# Patient Record
Sex: MALE | Race: WHITE | Employment: FULL TIME | URBAN - METROPOLITAN AREA
[De-identification: names, ages, dates, MRNs, and addresses within clinical notes are randomized per-mention and may not be internally consistent; named-entity substitution may affect disease eponyms.]

---

## 2024-03-21 ENCOUNTER — OFFICE VISIT (OUTPATIENT)
Dept: ORTHOPEDIC SURGERY | Age: 44
End: 2024-03-21

## 2024-03-21 VITALS — WEIGHT: 235 LBS | BODY MASS INDEX: 29.22 KG/M2 | HEIGHT: 75 IN

## 2024-03-21 DIAGNOSIS — S83.241A ACUTE MEDIAL MENISCUS TEAR OF RIGHT KNEE, INITIAL ENCOUNTER: Primary | ICD-10-CM

## 2024-03-21 DIAGNOSIS — M23.91 LOCKED KNEE, RIGHT: ICD-10-CM

## 2024-03-21 DIAGNOSIS — M25.461 EFFUSION OF RIGHT KNEE: ICD-10-CM

## 2024-03-21 DIAGNOSIS — M25.561 RIGHT KNEE PAIN, UNSPECIFIED CHRONICITY: ICD-10-CM

## 2024-03-21 RX ORDER — MELOXICAM 15 MG/1
15 TABLET ORAL DAILY PRN
Qty: 30 TABLET | Refills: 0 | Status: SHIPPED | OUTPATIENT
Start: 2024-03-21

## 2024-03-21 RX ORDER — DEXTROAMPHETAMINE SACCHARATE, AMPHETAMINE ASPARTATE, DEXTROAMPHETAMINE SULFATE AND AMPHETAMINE SULFATE 7.5; 7.5; 7.5; 7.5 MG/1; MG/1; MG/1; MG/1
30 TABLET ORAL 2 TIMES DAILY
COMMUNITY
Start: 2024-03-14

## 2024-03-21 NOTE — PROGRESS NOTES
KNEE VISIT      HISTORY OF PRESENT ILLNESS    Marla Jung is a 43 y.o. male who presents for evaluation of right knee pain that occurred on 3/14/2024 when he was stepping off of a deck that he was working on during his job.  He says that he noticed pain over the patella and became quite swollen and stiff and since that time he has had difficulty with giving out difficulty and steps twisting catching locking that pain generally is 3/10 although it sometimes 10/10 by the end of the day and with certain motions.  The pain is sharp and achy.  He cannot recall any prior issues with his right knee over the last several years    ROS    Well-documented patient history form dated 3/21/2024  All other ROS negative except for above.    Past Surgical history    No past surgical history on file.    PAST MEDICAL    No past medical history on file.    Allergies    No Known Allergies    Meds    Current Outpatient Medications   Medication Sig Dispense Refill    amphetamine-dextroamphetamine (ADDERALL) 30 MG tablet Take 1 tablet by mouth 2 times daily. Max Daily Amount: 60 mg      meloxicam (MOBIC) 15 MG tablet Take 1 tablet by mouth daily as needed for Pain 30 tablet 0    diclofenac sodium (VOLTAREN) 1 % GEL Apply 4 g topically 4 times daily 1 g 0     No current facility-administered medications for this visit.       Social    Social History     Socioeconomic History    Marital status: Single     Spouse name: Not on file    Number of children: Not on file    Years of education: Not on file    Highest education level: Not on file   Occupational History    Not on file   Tobacco Use    Smoking status: Not on file    Smokeless tobacco: Not on file   Substance and Sexual Activity    Alcohol use: Not on file    Drug use: Not on file    Sexual activity: Not on file   Other Topics Concern    Not on file   Social History Narrative    Not on file     Social Determinants of Health     Financial Resource Strain: Not on file   Food

## 2024-03-25 ENCOUNTER — TELEPHONE (OUTPATIENT)
Dept: ORTHOPEDIC SURGERY | Age: 44
End: 2024-03-25

## 2024-03-25 NOTE — TELEPHONE ENCOUNTER
MRI WAS APPROVED. PATIENT IS ABLE TO CALL [facility] TO MAKE AN APPT.  BOTH NUMBERS ARE DISCONNECTED. PLEASE GET NEW NUMBER.

## 2024-03-26 ENCOUNTER — TELEPHONE (OUTPATIENT)
Dept: ORTHOPEDIC SURGERY | Age: 44
End: 2024-03-26

## 2024-03-26 NOTE — TELEPHONE ENCOUNTER
Spoke w/Marla let him know he is okay to call [facility] to schedule his MRI, and updated his ph#.

## 2024-03-28 ENCOUNTER — OFFICE VISIT (OUTPATIENT)
Dept: ORTHOPEDIC SURGERY | Age: 44
End: 2024-03-28

## 2024-03-28 VITALS — WEIGHT: 235 LBS | BODY MASS INDEX: 29.22 KG/M2 | HEIGHT: 75 IN

## 2024-03-28 DIAGNOSIS — S83.241A ACUTE MEDIAL MENISCUS TEAR OF RIGHT KNEE, INITIAL ENCOUNTER: Primary | ICD-10-CM

## 2024-03-28 NOTE — PROGRESS NOTES
X-rays today for evaluation after an MRI of his right knee.  He does not fact demonstrate a complex tear of the medial meniscus consistent with his pain.  At this time would recommend requesting authorization to proceed with a right knee video arthroscopy with partial medial meniscectomy.  He appears to understand his diagnosis and the recommended surgery and its perioperative management and potential complications and risks.  He desires to proceed.  As soon as you obtain approval for that we will proceed with that surgery

## 2024-04-09 ENCOUNTER — PREP FOR PROCEDURE (OUTPATIENT)
Dept: ORTHOPEDIC SURGERY | Age: 44
End: 2024-04-09

## 2024-04-09 DIAGNOSIS — S83.241A ACUTE MEDIAL MENISCUS TEAR OF RIGHT KNEE, INITIAL ENCOUNTER: Primary | ICD-10-CM

## 2024-04-19 NOTE — PROGRESS NOTES

## 2024-04-26 NOTE — PROGRESS NOTES
Pt stated he was going to [facility] urgent care for pre op h&p on 4/22/24. Called urgent care. Stated pt had not been there since 2021. Left message with pt to return call to find out where and when he is going for pre op h&p.

## 2024-05-01 ENCOUNTER — ANESTHESIA EVENT (OUTPATIENT)
Dept: OPERATING ROOM | Age: 44
End: 2024-05-01
Payer: COMMERCIAL

## 2024-05-01 ENCOUNTER — HOSPITAL ENCOUNTER (OUTPATIENT)
Age: 44
Setting detail: OUTPATIENT SURGERY
Discharge: HOME | End: 2024-05-01
Attending: ORTHOPAEDIC SURGERY | Admitting: ORTHOPAEDIC SURGERY
Payer: COMMERCIAL

## 2024-05-01 ENCOUNTER — ANESTHESIA (OUTPATIENT)
Dept: OPERATING ROOM | Age: 44
End: 2024-05-01
Payer: COMMERCIAL

## 2024-05-01 VITALS
WEIGHT: 240 LBS | BODY MASS INDEX: 29.84 KG/M2 | SYSTOLIC BLOOD PRESSURE: 123 MMHG | DIASTOLIC BLOOD PRESSURE: 80 MMHG | HEIGHT: 75 IN | OXYGEN SATURATION: 100 % | RESPIRATION RATE: 12 BRPM | HEART RATE: 71 BPM | TEMPERATURE: 98.1 F

## 2024-05-01 DIAGNOSIS — S83.241A ACUTE MEDIAL MENISCUS TEAR OF RIGHT KNEE, INITIAL ENCOUNTER: Primary | ICD-10-CM

## 2024-05-01 DIAGNOSIS — M25.461 EFFUSION OF RIGHT KNEE: ICD-10-CM

## 2024-05-01 DIAGNOSIS — M23.91 LOCKED KNEE, RIGHT: ICD-10-CM

## 2024-05-01 DIAGNOSIS — M25.561 ACUTE PAIN OF RIGHT KNEE: ICD-10-CM

## 2024-05-01 PROCEDURE — 3600000004 HC SURGERY LEVEL 4 BASE: Performed by: ORTHOPAEDIC SURGERY

## 2024-05-01 PROCEDURE — 2580000003 HC RX 258: Performed by: ORTHOPAEDIC SURGERY

## 2024-05-01 PROCEDURE — 2580000003 HC RX 258: Performed by: NURSE ANESTHETIST, CERTIFIED REGISTERED

## 2024-05-01 PROCEDURE — 2500000003 HC RX 250 WO HCPCS: Performed by: NURSE ANESTHETIST, CERTIFIED REGISTERED

## 2024-05-01 PROCEDURE — L1830 KO IMMOB CANVAS LONG PRE OTS: HCPCS | Performed by: ORTHOPAEDIC SURGERY

## 2024-05-01 PROCEDURE — 3700000001 HC ADD 15 MINUTES (ANESTHESIA): Performed by: ORTHOPAEDIC SURGERY

## 2024-05-01 PROCEDURE — 6360000002 HC RX W HCPCS: Performed by: ORTHOPAEDIC SURGERY

## 2024-05-01 PROCEDURE — 2720000010 HC SURG SUPPLY STERILE: Performed by: ORTHOPAEDIC SURGERY

## 2024-05-01 PROCEDURE — 7100000010 HC PHASE II RECOVERY - FIRST 15 MIN: Performed by: ORTHOPAEDIC SURGERY

## 2024-05-01 PROCEDURE — 7100000011 HC PHASE II RECOVERY - ADDTL 15 MIN: Performed by: ORTHOPAEDIC SURGERY

## 2024-05-01 PROCEDURE — A4217 STERILE WATER/SALINE, 500 ML: HCPCS | Performed by: ORTHOPAEDIC SURGERY

## 2024-05-01 PROCEDURE — 6360000002 HC RX W HCPCS: Performed by: NURSE ANESTHETIST, CERTIFIED REGISTERED

## 2024-05-01 PROCEDURE — 7100000000 HC PACU RECOVERY - FIRST 15 MIN: Performed by: ORTHOPAEDIC SURGERY

## 2024-05-01 PROCEDURE — 6360000002 HC RX W HCPCS: Performed by: ANESTHESIOLOGY

## 2024-05-01 PROCEDURE — 3700000000 HC ANESTHESIA ATTENDED CARE: Performed by: ORTHOPAEDIC SURGERY

## 2024-05-01 PROCEDURE — 6370000000 HC RX 637 (ALT 250 FOR IP): Performed by: ANESTHESIOLOGY

## 2024-05-01 PROCEDURE — 3600000014 HC SURGERY LEVEL 4 ADDTL 15MIN: Performed by: ORTHOPAEDIC SURGERY

## 2024-05-01 PROCEDURE — 7100000001 HC PACU RECOVERY - ADDTL 15 MIN: Performed by: ORTHOPAEDIC SURGERY

## 2024-05-01 PROCEDURE — 2709999900 HC NON-CHARGEABLE SUPPLY: Performed by: ORTHOPAEDIC SURGERY

## 2024-05-01 RX ORDER — DIPHENHYDRAMINE HYDROCHLORIDE 50 MG/ML
12.5 INJECTION INTRAMUSCULAR; INTRAVENOUS
Status: DISCONTINUED | OUTPATIENT
Start: 2024-05-01 | End: 2024-05-01 | Stop reason: HOSPADM

## 2024-05-01 RX ORDER — SODIUM CHLORIDE 9 MG/ML
INJECTION, SOLUTION INTRAVENOUS PRN
Status: DISCONTINUED | OUTPATIENT
Start: 2024-05-01 | End: 2024-05-01 | Stop reason: HOSPADM

## 2024-05-01 RX ORDER — OXYCODONE HYDROCHLORIDE 5 MG/1
5 TABLET ORAL PRN
Status: COMPLETED | OUTPATIENT
Start: 2024-05-01 | End: 2024-05-01

## 2024-05-01 RX ORDER — FENTANYL CITRATE 50 UG/ML
INJECTION, SOLUTION INTRAMUSCULAR; INTRAVENOUS PRN
Status: DISCONTINUED | OUTPATIENT
Start: 2024-05-01 | End: 2024-05-01 | Stop reason: SDUPTHER

## 2024-05-01 RX ORDER — SODIUM CHLORIDE, SODIUM LACTATE, POTASSIUM CHLORIDE, CALCIUM CHLORIDE 600; 310; 30; 20 MG/100ML; MG/100ML; MG/100ML; MG/100ML
INJECTION, SOLUTION INTRAVENOUS CONTINUOUS
Status: DISCONTINUED | OUTPATIENT
Start: 2024-05-01 | End: 2024-05-01 | Stop reason: HOSPADM

## 2024-05-01 RX ORDER — SODIUM CHLORIDE 0.9 % (FLUSH) 0.9 %
5-40 SYRINGE (ML) INJECTION EVERY 12 HOURS SCHEDULED
Status: DISCONTINUED | OUTPATIENT
Start: 2024-05-01 | End: 2024-05-01 | Stop reason: HOSPADM

## 2024-05-01 RX ORDER — LIDOCAINE HYDROCHLORIDE 20 MG/ML
INJECTION, SOLUTION INFILTRATION; PERINEURAL PRN
Status: DISCONTINUED | OUTPATIENT
Start: 2024-05-01 | End: 2024-05-01 | Stop reason: SDUPTHER

## 2024-05-01 RX ORDER — OXYCODONE HYDROCHLORIDE 5 MG/1
10 TABLET ORAL PRN
Status: COMPLETED | OUTPATIENT
Start: 2024-05-01 | End: 2024-05-01

## 2024-05-01 RX ORDER — OXYCODONE HYDROCHLORIDE AND ACETAMINOPHEN 5; 325 MG/1; MG/1
1 TABLET ORAL EVERY 6 HOURS PRN
Qty: 28 TABLET | Refills: 0 | Status: SHIPPED | OUTPATIENT
Start: 2024-05-01 | End: 2024-05-08

## 2024-05-01 RX ORDER — MEPERIDINE HYDROCHLORIDE 25 MG/ML
12.5 INJECTION INTRAMUSCULAR; INTRAVENOUS; SUBCUTANEOUS EVERY 5 MIN PRN
Status: DISCONTINUED | OUTPATIENT
Start: 2024-05-01 | End: 2024-05-01 | Stop reason: HOSPADM

## 2024-05-01 RX ORDER — LABETALOL HYDROCHLORIDE 5 MG/ML
5 INJECTION, SOLUTION INTRAVENOUS EVERY 10 MIN PRN
Status: DISCONTINUED | OUTPATIENT
Start: 2024-05-01 | End: 2024-05-01 | Stop reason: HOSPADM

## 2024-05-01 RX ORDER — MAGNESIUM HYDROXIDE 1200 MG/15ML
LIQUID ORAL CONTINUOUS PRN
Status: COMPLETED | OUTPATIENT
Start: 2024-05-01 | End: 2024-05-01

## 2024-05-01 RX ORDER — ONDANSETRON 2 MG/ML
INJECTION INTRAMUSCULAR; INTRAVENOUS PRN
Status: DISCONTINUED | OUTPATIENT
Start: 2024-05-01 | End: 2024-05-01 | Stop reason: SDUPTHER

## 2024-05-01 RX ORDER — MIDAZOLAM HYDROCHLORIDE 1 MG/ML
INJECTION INTRAMUSCULAR; INTRAVENOUS PRN
Status: DISCONTINUED | OUTPATIENT
Start: 2024-05-01 | End: 2024-05-01 | Stop reason: SDUPTHER

## 2024-05-01 RX ORDER — SODIUM CHLORIDE, SODIUM LACTATE, POTASSIUM CHLORIDE, CALCIUM CHLORIDE 600; 310; 30; 20 MG/100ML; MG/100ML; MG/100ML; MG/100ML
INJECTION, SOLUTION INTRAVENOUS CONTINUOUS PRN
Status: DISCONTINUED | OUTPATIENT
Start: 2024-05-01 | End: 2024-05-01 | Stop reason: SDUPTHER

## 2024-05-01 RX ORDER — BUPIVACAINE HYDROCHLORIDE 2.5 MG/ML
INJECTION, SOLUTION INFILTRATION; PERINEURAL PRN
Status: DISCONTINUED | OUTPATIENT
Start: 2024-05-01 | End: 2024-05-01 | Stop reason: ALTCHOICE

## 2024-05-01 RX ORDER — NALOXONE HYDROCHLORIDE 0.4 MG/ML
INJECTION, SOLUTION INTRAMUSCULAR; INTRAVENOUS; SUBCUTANEOUS PRN
Status: DISCONTINUED | OUTPATIENT
Start: 2024-05-01 | End: 2024-05-01 | Stop reason: HOSPADM

## 2024-05-01 RX ORDER — SODIUM CHLORIDE 0.9 % (FLUSH) 0.9 %
5-40 SYRINGE (ML) INJECTION PRN
Status: DISCONTINUED | OUTPATIENT
Start: 2024-05-01 | End: 2024-05-01 | Stop reason: HOSPADM

## 2024-05-01 RX ORDER — LIDOCAINE HYDROCHLORIDE 10 MG/ML
0.3 INJECTION, SOLUTION EPIDURAL; INFILTRATION; INTRACAUDAL; PERINEURAL
Status: DISCONTINUED | OUTPATIENT
Start: 2024-05-01 | End: 2024-05-01 | Stop reason: HOSPADM

## 2024-05-01 RX ORDER — DEXAMETHASONE SODIUM PHOSPHATE 10 MG/ML
INJECTION, SOLUTION INTRAMUSCULAR; INTRAVENOUS PRN
Status: DISCONTINUED | OUTPATIENT
Start: 2024-05-01 | End: 2024-05-01 | Stop reason: SDUPTHER

## 2024-05-01 RX ORDER — ONDANSETRON 2 MG/ML
4 INJECTION INTRAMUSCULAR; INTRAVENOUS
Status: DISCONTINUED | OUTPATIENT
Start: 2024-05-01 | End: 2024-05-01 | Stop reason: HOSPADM

## 2024-05-01 RX ORDER — PROPOFOL 10 MG/ML
INJECTION, EMULSION INTRAVENOUS PRN
Status: DISCONTINUED | OUTPATIENT
Start: 2024-05-01 | End: 2024-05-01 | Stop reason: SDUPTHER

## 2024-05-01 RX ADMIN — FENTANYL CITRATE 100 MCG: 50 INJECTION INTRAMUSCULAR; INTRAVENOUS at 08:45

## 2024-05-01 RX ADMIN — ONDANSETRON HYDROCHLORIDE 4 MG: 2 INJECTION, SOLUTION INTRAMUSCULAR; INTRAVENOUS at 08:50

## 2024-05-01 RX ADMIN — PROPOFOL 300 MG: 10 INJECTION, EMULSION INTRAVENOUS at 08:47

## 2024-05-01 RX ADMIN — SODIUM CHLORIDE, POTASSIUM CHLORIDE, SODIUM LACTATE AND CALCIUM CHLORIDE: 600; 310; 30; 20 INJECTION, SOLUTION INTRAVENOUS at 08:45

## 2024-05-01 RX ADMIN — SODIUM CHLORIDE 2000 MG: 900 INJECTION INTRAVENOUS at 08:45

## 2024-05-01 RX ADMIN — DEXAMETHASONE SODIUM PHOSPHATE 10 MG: 10 INJECTION, SOLUTION INTRAMUSCULAR; INTRAVENOUS at 08:50

## 2024-05-01 RX ADMIN — LIDOCAINE HYDROCHLORIDE 60 MG: 20 INJECTION, SOLUTION INFILTRATION; PERINEURAL at 08:47

## 2024-05-01 RX ADMIN — OXYCODONE 5 MG: 5 TABLET ORAL at 10:08

## 2024-05-01 RX ADMIN — HYDROMORPHONE HYDROCHLORIDE 0.5 MG: 1 INJECTION, SOLUTION INTRAMUSCULAR; INTRAVENOUS; SUBCUTANEOUS at 09:38

## 2024-05-01 RX ADMIN — MIDAZOLAM 2 MG: 1 INJECTION INTRAMUSCULAR; INTRAVENOUS at 08:45

## 2024-05-01 ASSESSMENT — PAIN DESCRIPTION - DESCRIPTORS
DESCRIPTORS: ACHING
DESCRIPTORS: ACHING

## 2024-05-01 ASSESSMENT — PAIN - FUNCTIONAL ASSESSMENT: PAIN_FUNCTIONAL_ASSESSMENT: NONE - DENIES PAIN

## 2024-05-01 ASSESSMENT — PAIN DESCRIPTION - LOCATION
LOCATION: KNEE
LOCATION: KNEE

## 2024-05-01 ASSESSMENT — PAIN DESCRIPTION - PAIN TYPE
TYPE: SURGICAL PAIN
TYPE: SURGICAL PAIN

## 2024-05-01 ASSESSMENT — PAIN SCALES - GENERAL
PAINLEVEL_OUTOF10: 5
PAINLEVEL_OUTOF10: 7

## 2024-05-01 NOTE — DISCHARGE INSTRUCTIONS
Follow up with Dr. Avelar per arrangements.  Weight bearing 50% x 2 days with crutches, then as tolerated.  Remove dressing in 2-3 days, then may shower.  Ice and elevate knee as tolerated.  ANESTHESIA DISCHARGE INSTRUCTIONS    You are under the influence of drugs- do not drink alcohol, drive a car, operate machinery(such as power tools, kitchen appliances, etc), sign legal documents, or make any important decisions for 24 hours (or while on pain medications).   Children should not ride bikes or skate boards or play on gym sets  for 24 hours after surgery.  A responsible adult should be with you for 24 hours.  Rest at home today- increase activity as tolerated.  Progress slowly to a regular diet unless your physician has instructed you otherwise. Drink plenty of water.    CALL YOUR DOCTOR IF YOU:  Have moderate to severe nausea or vomiting AND are unable to hold down fluids or prescribed medications.  Have bright red bloody drainage from your dressing that won't stop oozing.  Do not get relief with your pain medication    NORMAL (POSSIBLE) SIDE EFFECTS FROM ANESTHESIA:     Confusion, temporary memory loss, delayed reaction times in the first 24 hours  Lightheadedness, dizziness, difficulty focusing, blurred vision  Nausea/vomiting can happen  Shivering, feeling cold, sore throat, cough and muscle aches should stop within 24-48 hours  Trouble urinating - call your surgeon if it has been more than 8 hrs  Bruising or soreness at the IV site - call if it remains red, firm or there is drainage             FEMALES OF CHILDBEARING AGE WHO ARE TAKING BIRTH CONTROL PILLS:  You may have received a medication during your procedure that interferes with the   actions of birth control pills (Bridion or Emend). Use some other kind of birth control in addition to your pills, like a condom, for 1 month after your procedure to prevent unwanted pregnancy.    The following instructions are to be followed if you have a known history or

## 2024-05-01 NOTE — OP NOTE
[address] [address]                            OPERATIVE REPORT      PATIENT NAME: LENKA TAMAYO               : 1980  MED REC NO: [id number]                      ROOM: OR [place]  ACCOUNT NO: [id number]                       ADMIT DATE: 2024  PROVIDER: Manuel Avelar MD      DATE OF PROCEDURE:  2024    SURGEON:  Manuel Avelar MD    PREOPERATIVE DIAGNOSIS:  Right knee acute medial meniscus tear.    POSTOPERATIVE DIAGNOSIS:  Right knee acute medial meniscus tear.    OPERATION PERFORMED:  Right knee video arthroscopy with partial medial meniscectomy.    ANESTHESIA:  General.    BLOOD LOSS:  Less than 5 mL.    COMPLICATIONS:  None noted.    INDICATIONS FOR OPERATION:  This 43-year-old male had acute onset of right knee pain and after failure of all modalities, he elected for the recommendation of surgical intervention.    DESCRIPTION OF OPERATION:  The patient was taken to the operating room, where general anesthetic was obtained.  Antibiotics were given IV piggyback preoperatively.  Leg was placed in a leg flores, sterilely prepped and draped, and 2-portal arthroscopy of the right knee was carried out in usual fashion using a 30-degree arthroscope.  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  Medially, he had a complex tear of the posterior half of the medial meniscus, and a partial medial meniscectomy was performed with both rigid and power instrumentation to a well-contoured and balanced inner rim.  The ACL, PCL, and lateral compartments were free of pathology.  Knee was then thoroughly irrigated and evacuated of all loose debris and instilled 20 mL of 0.25% Marcaine plain.  The portals were repaired with 4-0 nylon in a figure-of-eight fashion.  The knee was dressed with Steri-Strips, dressing sponge, ABDs, Webril, and Ace wrap.

## 2024-05-01 NOTE — PROGRESS NOTES
Patient discharged via wheelchair in stable condition with all belongings to private car.Vidhi Jason RN

## 2024-05-01 NOTE — ANESTHESIA PRE PROCEDURE
Department of Anesthesiology  Preprocedure Note       Name:  Marla Jung   Age:  43 y.o.  :  1980                                          MRN:  [id number]         Date:  2024      Surgeon: Surgeon(s):  Manuel Avelar MD    Procedure: Procedure(s):  RIGHT KNEE VIDEO ARTHROSCOPY WITH MEDIAL MENISECTOMY    Medications prior to admission:   Prior to Admission medications    Medication Sig Start Date End Date Taking? Authorizing Provider   amphetamine-dextroamphetamine (ADDERALL) 30 MG tablet Take 1 tablet by mouth 2 times daily. 3/14/24   Provider, MD Summer   meloxicam (MOBIC) 15 MG tablet Take 1 tablet by mouth daily as needed for Pain 3/21/24   Manuel Avelar MD   diclofenac sodium (VOLTAREN) 1 % GEL Apply 4 g topically 4 times daily 3/21/24   Manuel Avelar MD       Current medications:    Current Facility-Administered Medications   Medication Dose Route Frequency Provider Last Rate Last Admin    lidocaine PF 1 % injection 0.3 mL  0.3 mL IntraDERmal Once PRN Sharan Brand MD        lactated ringers IV soln infusion   IntraVENous Continuous Sharan Brand MD        sodium chloride flush 0.9 % injection 5-40 mL  5-40 mL IntraVENous 2 times per day Sharan Brand MD        sodium chloride flush 0.9 % injection 5-40 mL  5-40 mL IntraVENous PRN Sharan Brand MD        0.9 % sodium chloride infusion   IntraVENous PRN Sharan Brand MD           Allergies:  No Known Allergies    Problem List:    Patient Active Problem List   Diagnosis Code    Effusion of right knee M25.461    Locked knee, right M23.91    Right knee pain M25.561    Acute medial meniscus tear of right knee S83.241A       Past Medical History:        Diagnosis Date    ADHD        Past Surgical History:        Procedure Laterality Date    KNEE SURGERY      WRIST SURGERY         Social History:    Social History     Tobacco Use    Smoking status: Never    Smokeless tobacco: Current     Types: Chew   Substance Use Topics

## 2024-05-01 NOTE — BRIEF OP NOTE
Brief Postoperative Note      Patient: Marla Jung  YOB: 1980  MRN: [id number]    Date of Procedure: 5/1/2024    Pre-Op Diagnosis Codes:     * Acute medial meniscus tear of right knee [S83.241A]    Post-Op Diagnosis: Same       Procedure(s):  RIGHT KNEE VIDEO ARTHROSCOPY WITH MEDIAL MENISECTOMY    Surgeon(s):  Manuel Avelar MD    Assistant:  Surgical Assistant: Erich Mcmullen    Anesthesia: General    Estimated Blood Loss (mL): Minimal    Complications: None    Specimens:   * No specimens in log *    Implants:  * No implants in log *      Drains: * No LDAs found *    Findings:  Infection Present At Time Of Surgery (PATOS) (choose all levels that have infection present):  No infection present  Other Findings: none      Electronically signed by Manuel Avelar MD on 5/1/2024 at 9:11 AM

## 2024-05-01 NOTE — ANESTHESIA POSTPROCEDURE EVALUATION
Department of Anesthesiology  Postprocedure Note    Patient: Marla Jung  MRN: [id number]  YOB: 1980  Date of evaluation: 5/1/2024    Procedure Summary       Date: 05/01/24 Room / Location: [address]    Anesthesia Start: 0845 Anesthesia Stop: 0920    Procedure: RIGHT KNEE VIDEO ARTHROSCOPY WITH MEDIAL MENISECTOMY (Right: Knee) Diagnosis:       Acute medial meniscus tear of right knee      (Acute medial meniscus tear of right knee [S83.241A])    Surgeons: Manuel Avelar MD Responsible Provider: Miranda Whitt MD    Anesthesia Type: general ASA Status: 2            Anesthesia Type: No value filed.    Liana Phase I: Liana Score: 9    Liana Phase II: Liana Score: 10    Anesthesia Post Evaluation    Comments: Postoperative Anesthesia Note    Name:    Marla Jung  MRN:      [id number]    Patient Vitals in the past 12 hrs:  05/01/24 1010, Pulse:71, Resp:12, SpO2:100 %  05/01/24 1005, Pulse:67, Resp:14, SpO2:99 %  05/01/24 1000, BP:123/80, Temp:98.1 °F (36.7 °C), Temp src:Temporal, Pulse:67, Resp:18, SpO2:99 %  05/01/24 0950, Pulse:67, Resp:11, SpO2:99 %  05/01/24 0945, BP:130/77, Temp:98.1 °F (36.7 °C), Temp src:Temporal, Pulse:71, Resp:13, SpO2:98 %  05/01/24 0940, BP:121/64, Pulse:74, Resp:15, SpO2:98 %  05/01/24 0938, SpO2:97 %  05/01/24 0930, BP:94/63, Pulse:74, Resp:15, SpO2:97 %  05/01/24 0926, Temp:98.2 °F (36.8 °C), Temp src:Temporal  05/01/24 0925, BP:127/78, Pulse:71, Resp:19, SpO2:94 %  05/01/24 0725, BP:127/66, Temp:97.9 °F (36.6 °C), Temp src:Temporal, Pulse:72, Resp:16, SpO2:98 %, Height:1.905 m (6' 3\"), Weight:108.9 kg (240 lb)     LABS:    CBC  Lab Results       Component                Value               Date/Time                  WBC                      5.8                 03/01/2010 08:10 AM        HGB                      17.1                03/01/2010 08:10 AM        HCT                      49.5                03/01/2010 08:10 AM

## 2024-05-01 NOTE — PROGRESS NOTES
Patient admitted from OR to PACU.  Bedside report received.  Patient immediately hooked up to heart monitor.  Oral airway in place.Vidhi Jason RN

## 2024-05-01 NOTE — H&P
Update History & Physical    The patient's History and Physical was reviewed with the patient and I examined the patient. There was no change. The surgical site was confirmed by the patient and me.       Plan: The risks, benefits, expected outcome, and alternative to the recommended procedure have been discussed with the patient. Patient understands and wants to proceed with the procedure.     Electronically signed by Manuel Avelar MD on 5/1/2024 at 7:58 AM

## 2024-05-01 NOTE — PROGRESS NOTES
Discharge instructions given to patient and patients wife. Both deny any questions at this time.Vidhi Jason RN

## 2024-05-09 ENCOUNTER — OFFICE VISIT (OUTPATIENT)
Dept: ORTHOPEDIC SURGERY | Age: 44
End: 2024-05-09

## 2024-05-09 VITALS — BODY MASS INDEX: 29.84 KG/M2 | WEIGHT: 240 LBS | HEIGHT: 75 IN

## 2024-05-09 DIAGNOSIS — S83.241A ACUTE MEDIAL MENISCUS TEAR OF RIGHT KNEE, INITIAL ENCOUNTER: Primary | ICD-10-CM

## 2024-05-09 PROCEDURE — 99024 POSTOP FOLLOW-UP VISIT: CPT | Performed by: ORTHOPAEDIC SURGERY

## 2024-05-09 NOTE — PROGRESS NOTES
FOLLOW-UP VISIT      The patient returns for follow-up s/p right knee video arthroscopy with partial medial meniscectomy    Date of Surgery    5/1/2024    Wound Status    Sutures Removed, Incisions are healing well with no surrounding erythema, and minimal ecchymosis.     Exam    He has no effusion no swelling and no evidence of infection or DVT.  He is walking without amatory aids and happy with the result so far.  He says that the pain that he had preoperatively he noted was gone even on the day of surgery.    Plan    Slow return to normal activity    Follow-up Appointment    4 weeks as needed

## 2024-07-16 ENCOUNTER — OFFICE VISIT (OUTPATIENT)
Dept: ORTHOPEDIC SURGERY | Age: 44
End: 2024-07-16

## 2024-07-16 VITALS — BODY MASS INDEX: 29.84 KG/M2 | WEIGHT: 240 LBS | HEIGHT: 75 IN

## 2024-07-16 DIAGNOSIS — M25.461 EFFUSION OF RIGHT KNEE: ICD-10-CM

## 2024-07-16 DIAGNOSIS — S83.241D ACUTE MEDIAL MENISCUS TEAR OF RIGHT KNEE, SUBSEQUENT ENCOUNTER: Primary | ICD-10-CM

## 2024-07-16 RX ORDER — PREDNISONE 5 MG/1
5 TABLET ORAL SEE ADMIN INSTRUCTIONS
Qty: 55 TABLET | Refills: 1 | Status: SHIPPED | OUTPATIENT
Start: 2024-07-16 | End: 2024-07-26

## 2024-07-16 NOTE — PROGRESS NOTES
He returns today for reevaluation of his right knee.  He says is a little stiffness recently and some soreness at this time examination does have 1+ effusion but good range of motion of the instability.  At this time is probably reactive and just due to modification of activity I would recommend a 10-day prednisone taper and follow-up in 2 weeks.  If he is not substantially improved I may recommend aspiration.

## 2024-08-06 ENCOUNTER — OFFICE VISIT (OUTPATIENT)
Dept: ORTHOPEDIC SURGERY | Age: 44
End: 2024-08-06

## 2024-08-06 DIAGNOSIS — S83.241D ACUTE MEDIAL MENISCUS TEAR OF RIGHT KNEE, SUBSEQUENT ENCOUNTER: Primary | ICD-10-CM

## 2024-08-06 DIAGNOSIS — M25.461 EFFUSION OF RIGHT KNEE: ICD-10-CM

## 2024-08-06 RX ORDER — CELECOXIB 200 MG/1
200 CAPSULE ORAL 2 TIMES DAILY
Qty: 60 CAPSULE | Refills: 3 | Status: SHIPPED | OUTPATIENT
Start: 2024-08-06

## 2024-08-06 NOTE — PROGRESS NOTES
FOLLOW-UP VISIT      The patient returns for follow-up s/p right knee video arthroscopy with partial medial meniscectomy    Date of Surgery    5/1/2024    Exam    His effusion is gone but he still has grade 5 or 10 pain and he can be achy and sharp at times and causes some buckling.  He says is still sore but generally is tolerant of that.  His surgery is now 3+ months down the road.  On exam today he has no effusion good range of motion no instability no crepitus and I cannot elicit pain..    Plan    At this time I will give him some medication to take orally and apply topically and request a cortisone injection to be given to the right knee.  He should return for that injection.    Follow-up Appointment    For injection when approved

## (undated) DEVICE — TUBING PMP IRRIG GOFLO

## (undated) DEVICE — SOLUTION IRRIG 3000ML 0.9% SOD CHL USP UROMATIC PLAS CONT

## (undated) DEVICE — 4.5 MM INCISOR PLUS STRAIGHT                                    BLADES, POWER/EP-1, VIOLET, PACKAGED                                    6 PER BOX, STERILE

## (undated) DEVICE — GLOVE ORANGE PI 8 1/2   MSG9085

## (undated) DEVICE — WEREWOLF FLOW 90 COBLATION WAND: Brand: COBLATION

## (undated) DEVICE — MHCZ ARTHROSCOPY: Brand: MEDLINE INDUSTRIES, INC.

## (undated) DEVICE — Device

## (undated) DEVICE — SUTURE NONABSORBABLE MONOFILAMENT 4-0 FS-2 18 IN ETHILON 662H